# Patient Record
Sex: MALE | Race: BLACK OR AFRICAN AMERICAN | NOT HISPANIC OR LATINO | Employment: FULL TIME | URBAN - METROPOLITAN AREA
[De-identification: names, ages, dates, MRNs, and addresses within clinical notes are randomized per-mention and may not be internally consistent; named-entity substitution may affect disease eponyms.]

---

## 2019-08-27 ENCOUNTER — OCCUPATIONAL HEALTH (OUTPATIENT)
Dept: URGENT CARE | Facility: CLINIC | Age: 39
End: 2019-08-27

## 2019-08-27 DIAGNOSIS — Z02.1 PRE-EMPLOYMENT EXAMINATION: Primary | ICD-10-CM

## 2019-08-27 LAB
CTP QC/QA: YES
POC 10 PANEL DRUG SCREEN: NORMAL

## 2019-08-27 PROCEDURE — 80305 POCT RAPID DRUG SCREEN 10 PANEL: ICD-10-PCS | Mod: QW,S$GLB,, | Performed by: PHYSICIAN ASSISTANT

## 2019-08-27 PROCEDURE — 99499 UNLISTED E&M SERVICE: CPT | Mod: S$GLB,,, | Performed by: PHYSICIAN ASSISTANT

## 2019-08-27 PROCEDURE — 80305 DRUG TEST PRSMV DIR OPT OBS: CPT | Mod: QW,S$GLB,, | Performed by: PHYSICIAN ASSISTANT

## 2019-08-27 PROCEDURE — 99499 PHYSICAL, BASIC COMPLEXITY: ICD-10-PCS | Mod: S$GLB,,, | Performed by: PHYSICIAN ASSISTANT

## 2020-03-03 ENCOUNTER — HOSPITAL ENCOUNTER (EMERGENCY)
Facility: HOSPITAL | Age: 40
Discharge: HOME | End: 2020-03-03
Attending: EMERGENCY MEDICINE

## 2020-03-03 VITALS
RESPIRATION RATE: 18 BRPM | HEART RATE: 77 BPM | SYSTOLIC BLOOD PRESSURE: 119 MMHG | WEIGHT: 172 LBS | DIASTOLIC BLOOD PRESSURE: 80 MMHG | BODY MASS INDEX: 22.8 KG/M2 | HEIGHT: 73 IN | OXYGEN SATURATION: 97 % | TEMPERATURE: 96 F

## 2020-03-03 DIAGNOSIS — M25.561 RIGHT KNEE PAIN: ICD-10-CM

## 2020-03-03 PROCEDURE — 99284 EMERGENCY DEPT VISIT MOD MDM: CPT | Mod: 25

## 2020-03-03 PROCEDURE — 96372 THER/PROPH/DIAG INJ SC/IM: CPT

## 2020-03-03 PROCEDURE — 63600175 PHARM REV CODE 636 W HCPCS: Performed by: NURSE PRACTITIONER

## 2020-03-03 RX ORDER — KETOROLAC TROMETHAMINE 30 MG/ML
10 INJECTION, SOLUTION INTRAMUSCULAR; INTRAVENOUS
Status: COMPLETED | OUTPATIENT
Start: 2020-03-03 | End: 2020-03-03

## 2020-03-03 RX ORDER — SULINDAC 150 MG/1
150 TABLET ORAL 2 TIMES DAILY
Qty: 10 TABLET | Refills: 0 | Status: SHIPPED | OUTPATIENT
Start: 2020-03-03 | End: 2020-03-08

## 2020-03-03 RX ADMIN — KETOROLAC TROMETHAMINE 10 MG: 30 INJECTION, SOLUTION INTRAMUSCULAR; INTRAVENOUS at 11:03

## 2020-03-03 NOTE — DISCHARGE INSTRUCTIONS
You have been given an anti-inflammatory (clinoril (sulindac)) prescription.  While taking this medication, do not use ibuprofen, motrin, advil, aleve, or any other anti-inflammatory. Return to the Emergency department for any worsening or failure to improve, otherwise follow up with your primary care provider.

## 2020-03-03 NOTE — ED PROVIDER NOTES
Encounter Date: 3/3/2020    SCRIBE #1 NOTE: I, Yehuda Oropeza, am scribing for, and in the presence of,  Dino Ken DNP. I have scribed the following portions of the note - Other sections scribed: HPI, ROS, PE.       History     Chief Complaint   Patient presents with    Knee Pain     pt c/o right knee pain and swelling x 2 days. Denies any injuries or trauma      CC: Knee Pain    HPI: This 39 y.o. Male with no pertinent past medical history presents to the ED for an evaluation of R knee pain and swelling for the past month. Pt reports his symptoms worsened for the past 2 days. His pain is worse with working and feels tight. Pt noticed his pain after working with shoes not his size. He denies injuries or trauma. Pt has no relief for his pain this morning. His is requesting further evaluation and pain relief.    The history is provided by the patient. No  was used.     Review of patient's allergies indicates:  No Known Allergies  History reviewed. No pertinent past medical history.  History reviewed. No pertinent surgical history.  History reviewed. No pertinent family history.  Social History     Tobacco Use    Smoking status: Never Smoker    Smokeless tobacco: Never Used   Substance Use Topics    Alcohol use: Never     Frequency: Never    Drug use: Never     Review of Systems   Constitutional: Negative for fever.   HENT: Negative for sore throat.    Respiratory: Negative for shortness of breath.    Cardiovascular: Negative for chest pain.   Gastrointestinal: Negative for nausea.   Genitourinary: Negative for dysuria.   Musculoskeletal: Positive for arthralgias (R knee) and joint swelling (R knee). Negative for back pain.   Skin: Negative for rash.   Neurological: Negative for weakness.   Hematological: Does not bruise/bleed easily.       Physical Exam     Initial Vitals [03/03/20 1040]   BP Pulse Resp Temp SpO2   136/85 92 18 98.8 °F (37.1 °C) 97 %      MAP       --         Physical  Exam    Constitutional: He appears well-developed and well-nourished.   HENT:   Head: Normocephalic and atraumatic.   Right Ear: Hearing and external ear normal.   Left Ear: Hearing and external ear normal.   Nose: Nose normal.   Mouth/Throat: Oropharynx is clear and moist.   Eyes: Conjunctivae and EOM are normal. Pupils are equal, round, and reactive to light.   Neck: Normal range of motion. Neck supple.   Cardiovascular: Normal rate and regular rhythm.   Pulmonary/Chest: Breath sounds normal. No stridor. No respiratory distress.   Musculoskeletal: Normal range of motion. He exhibits no edema or tenderness.        Right knee: Normal. He exhibits no swelling and no effusion.   Skin: Skin is warm and dry. No rash noted.   Psychiatric: He has a normal mood and affect. His speech is normal and behavior is normal. Thought content normal.         ED Course   Procedures  Labs Reviewed - No data to display       Imaging Results          X-Ray Knee 3 View Right (Final result)  Result time 03/03/20 11:28:47    Final result by Jay Long MD (03/03/20 11:28:47)                 Impression:      No evidence of fracture.No significant degenerative changes.      Electronically signed by: Jay Long MD  Date:    03/03/2020  Time:    11:28             Narrative:    EXAMINATION:  XR KNEE 3 VIEW RIGHT    CLINICAL HISTORY:  Loading..    TECHNIQUE:  Three-view RIGHT knee    COMPARISON:  None.    FINDINGS:  The alignment is within normal limits.  No displaced fractures identified.  No evidence of lytic or blastic lesions.Joint spaces are unremarkable.Soft tissues are unremarkable.                                       APC / Resident Notes:   39-year-old male reports 1 month of right knee swelling and discomfort that worsened over the last 2 days.  He continuously reports that feels as though there is a tightening within his knee.  Especially with frequent walking.  He states it is worse wh he as at work as a .  States  he has use no medications or treatments for this issue.  Differential diagnosis includes fracture, dislocation, sprain, strain, septic joint.  On physical exam patient is afebrile and nontoxic in no apparent distress.  The right knee exhibits no varus or valgus testing positivity, no anterior-posterior drawer test positivity, no effusion noted. X-ray of the right knee indicates no fracture or dislocation.  I will give Toradol 10 mg IM now and give anti-inflammatories for home use.  Symptomatic therapies and return precautions on AVS.   Medication choices were made after reviewing allergies, medications, history, available laboratories.  Patient to follow up with orthopedic referral provided.         Scribe Attestation:   Scribe #1: I performed the above scribed service and the documentation accurately describes the services I performed. I attest to the accuracy of the note.            ED Course as of Mar 03 1441   Tue Mar 03, 2020   1131 No evidence of fracture.No significant degenerative changes.   X-Ray Knee 3 View Right [VC]      ED Course User Index  [VC] Dino Ken DNP                Clinical Impression:       ICD-10-CM ICD-9-CM   1. Right knee pain M25.561 719.46         Disposition:   Disposition: Discharged  Condition: Stable     ED Disposition Condition    Discharge Stable        ED Prescriptions     Medication Sig Dispense Start Date End Date Auth. Provider    sulindac (CLINORIL) 150 MG tablet Take 1 tablet (150 mg total) by mouth 2 (two) times daily. for 5 days 10 tablet 3/3/2020 3/8/2020 Dino Ken DNP        Follow-up Information     Follow up With Specialties Details Why Contact Info    [facility] [place]  Schedule an appointment as soon as possible for a visit   [address]  [place] [address]  [phone]                        Scribe attestation: SRAVAN HANSON DNP ACNP-BC FNP-C , personally performed the services described in this documentation. All medical record  entries made by the scribe were at my direction and in my presence.  I have reviewed the chart and agree that the record reflects my personal performance and is accurate and complete.               Dino Ken, [facility]  03/03/20 0142

## 2020-03-03 NOTE — ED TRIAGE NOTES
"Pt. reprots right knee started to swell about 1 month ago. Pt. Reports symptoms became worst about 2 days ago. Pt. Denies any injures, fall or trauma. Pt. Reports pain is a "throbbing" sensation.   "